# Patient Record
Sex: FEMALE | Race: WHITE | Employment: STUDENT | URBAN - METROPOLITAN AREA
[De-identification: names, ages, dates, MRNs, and addresses within clinical notes are randomized per-mention and may not be internally consistent; named-entity substitution may affect disease eponyms.]

---
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
## 2020-01-16 ENCOUNTER — HOSPITAL ENCOUNTER (EMERGENCY)
Age: 20
Discharge: HOME | End: 2020-01-16

## 2020-01-16 VITALS
HEIGHT: 64 IN | SYSTOLIC BLOOD PRESSURE: 126 MMHG | HEART RATE: 97 BPM | DIASTOLIC BLOOD PRESSURE: 87 MMHG | RESPIRATION RATE: 16 BRPM | BODY MASS INDEX: 20.49 KG/M2 | TEMPERATURE: 98 F | OXYGEN SATURATION: 98 % | WEIGHT: 120 LBS

## 2020-01-16 PROCEDURE — RU007 HC SPORTS PHYSICAL-SELF PAY

## 2020-01-16 PROCEDURE — 9900000020 HC SPORTS PHYSICAL-SELF PAY

## 2020-01-16 PROCEDURE — 4500000002 HC ER NO CHARGE

## 2020-01-16 PROCEDURE — 99999 PR OFFICE/OUTPT VISIT,PROCEDURE ONLY: CPT | Performed by: NURSE PRACTITIONER

## 2020-01-16 ASSESSMENT — ENCOUNTER SYMPTOMS
SHORTNESS OF BREATH: 0
EYE ITCHING: 0
ABDOMINAL PAIN: 0
SORE THROAT: 0
EYE REDNESS: 0
DIARRHEA: 0
CHEST TIGHTNESS: 0
VOMITING: 0
NAUSEA: 0

## 2020-01-16 ASSESSMENT — VISUAL ACUITY
OD: 20/20
OS: 20/25

## 2020-01-16 NOTE — ED NOTES
Copies of physical forms and immunization records  Attached for scanning to EHR     Silvestre Aquino RN  01/16/20 3607

## 2020-03-16 RX ORDER — SUMATRIPTAN 100 MG/1
TABLET, FILM COATED ORAL
Qty: 9 TABLET | Refills: 5 | Status: SHIPPED | OUTPATIENT
Start: 2020-03-16 | End: 2021-08-19 | Stop reason: SDUPTHER

## 2020-03-20 ENCOUNTER — TELEPHONE (OUTPATIENT)
Dept: NEUROLOGY | Age: 20
End: 2020-03-20

## 2020-03-23 ENCOUNTER — OFFICE VISIT (OUTPATIENT)
Dept: NEUROLOGY | Age: 20
End: 2020-03-23
Payer: COMMERCIAL

## 2020-03-23 VITALS
BODY MASS INDEX: 21.76 KG/M2 | WEIGHT: 122.8 LBS | HEART RATE: 73 BPM | DIASTOLIC BLOOD PRESSURE: 77 MMHG | HEIGHT: 63 IN | SYSTOLIC BLOOD PRESSURE: 110 MMHG | TEMPERATURE: 97.7 F

## 2020-03-23 PROCEDURE — 99214 OFFICE O/P EST MOD 30 MIN: CPT | Performed by: NURSE PRACTITIONER

## 2020-03-23 RX ORDER — GALCANEZUMAB 100 MG/ML
300 INJECTION, SOLUTION SUBCUTANEOUS
Qty: 1 SYRINGE | Refills: 5 | Status: SHIPPED | OUTPATIENT
Start: 2020-03-23 | End: 2021-08-26

## 2020-03-23 RX ORDER — PREDNISONE 20 MG/1
TABLET ORAL
Qty: 18 TABLET | Refills: 1 | Status: SHIPPED | OUTPATIENT
Start: 2020-03-23 | End: 2021-08-26

## 2020-03-23 NOTE — PROGRESS NOTES
3/23/2020) 30 tablet 3     No current facility-administered medications for this visit. ALLERGIES:   No Known Allergies                              REVIEW OF SYSTEMS    CONSTITUTIONAL Weight: absent, Appetite: absent, Fatigue: absent      HEENT Ears: normal, Visual disturbance: absent   RESPIRATORY Shortness of breath: absent, Cough: absent   CARDIOVASCULAR Chest pain: absent, Leg swelling :absent      GI Constipation: absent, Diarrhea: absent, Swallowing change: absent       Urinary frequency: absent, Urinary urgency: absent,   MUSCULOSKELETAL Neck pain: absent, Back pain: absent, Stiffness: absent, Muscle pain: absent, Joint pain: absent Restless legs: absent   DERMATOLOGIC Hair loss: absent, Skin changes: absent   NEUROLOGIC Memory loss: absent, Confusion: absent, Seizures: absent Trouble walking or imbalance: absent, Dizziness: absent, Weakness: absent, Numbness: absent Tremor: absent, Spasm: absent, Speech difficulty: absent, Headache: present, Light sensitivity: absent   PSYCHIATRIC Anxiety: absent, Hallucination: absent, Mood disorder: absent   HEMATOLOGIC Abnormal bleeding: absent, Anemia: absent,            PHYSICAL EXAMINATION:       Vitals:    03/23/20 0905   BP: 110/77   Pulse: 73   Temp: 97.7 °F (36.5 °C)                                              .                                                                                                     General Appearance:  Alert, cooperative, no signs of distress, appears stated age   Head:  Normocephalic, no signs of trauma   Eyes:  Conjunctiva/corneas clear;  eyelids intact   Ears:  Normal external ear and canals   Nose: Nares normal, mucosa normal, no drainage    Throat: Lips and tongue normal; teeth normal;  gums normal   Neck: Supple, intact flexion, extension and rotation;   trachea midline;  no adenopathy;   thyroid: not enlarged;   no carotid pulse abnormality   Back:   Symmetric, no curvature, ROM adequate   Lungs:   Respirations unlabored   Heart:  Regular rate and rhythm           Extremities: Extremities normal, no cyanosis, no edema   Pulses: Symmetric over head and neck   Skin: Skin color, texture normal, no rashes, no lesions                                     NEUROLOGIC EXAMINATION    Neurologic Exam  Mental status    Alert and oriented x 3; intact memory with no confusion, speech or language problems; no hallucinations or delusions  Fund of information appropriate for level of education    Cranial nerves    II - visual fields intact to confrontation bilaterally  III, IV, VI - extra-ocular muscles full: no pupillary defect; no CINTIA, no nystagmus, no ptosis   V - normal facial sensation                                                               VII - normal facial symmetry                                                             VIII - intact hearing                                                                             IX, X - symmetrical palate                                                                  XI - symmetrical shoulder shrug                                                       XII - tongue midline without atrophy or fasciculation      Motor function  Normal muscle bulk and tone; strength 5/5 on all 4 extremities, no pronator drift      Sensory function Intact to light touch, pinprick, vibration, proprioception on all 4 extremities      Cerebellar Intact fine motor movement. No involuntary movements or tremors. No ataxia or dysmetria on finger to nose or heel to shin testing      Reflex function DTR 2+ on bilateral UE and LE, symmetric. Negative Babinski      Gait                   normal base and arm swing                  ASSESSMENT AND PLAN:           In summary, your patient, Roz Guerrier exhibits the following, with associated plan:    1. Intractable cluster headache. The patient has not had a headache for almost 2 years. It is been present for approximately 3 weeks.   1. Prednisone 60 mg daily

## 2021-08-18 ENCOUNTER — TELEPHONE (OUTPATIENT)
Dept: NEUROLOGY | Age: 21
End: 2021-08-18

## 2021-08-18 NOTE — TELEPHONE ENCOUNTER
Caro's mom called in. She was last seen in March 2020. She did make an appt for next week but she wouldlike to know if Isa will refill the Sumatriptan in the  meantime to [place] in [facility]. I explained pt. need to be seen on a yearly basis but will ask Isa LEONARD if she would order a refill  until she can be seen next week.

## 2021-08-19 RX ORDER — SUMATRIPTAN 100 MG/1
TABLET, FILM COATED ORAL
Qty: 9 TABLET | Refills: 0 | Status: SHIPPED | OUTPATIENT
Start: 2021-08-19 | End: 2021-08-26 | Stop reason: SDUPTHER

## 2021-08-23 DIAGNOSIS — G44.011 INTRACTABLE EPISODIC CLUSTER HEADACHE: Primary | ICD-10-CM

## 2021-08-23 NOTE — TELEPHONE ENCOUNTER
Pt's father called in stating pt's cluster migraines are getting worse. She has an appt on Thursday but they do not think she can wait until then. She has used her allotted amount of Imitrex since picking up it up last week. He just informed me that she using the Imitrex every 12 hours. I did inform him that it could be causing medication overuse migraines. He is wondering if there is something else that she can be prescribed? Please advise, thanks.

## 2021-08-23 NOTE — TELEPHONE ENCOUNTER
I want to give her a steroid taper and give her Emgality 300 mg injection.   If she is agreeable we can send the prescription to her pharmacy

## 2021-08-24 RX ORDER — PREDNISONE 20 MG/1
TABLET ORAL
Qty: 18 TABLET | Refills: 0 | Status: SHIPPED | OUTPATIENT
Start: 2021-08-24 | End: 2021-08-26 | Stop reason: SDUPTHER

## 2021-08-24 RX ORDER — GALCANEZUMAB 100 MG/ML
300 INJECTION, SOLUTION SUBCUTANEOUS
Qty: 3 SYRINGE | Refills: 0 | Status: SHIPPED | OUTPATIENT
Start: 2021-08-24 | End: 2021-08-26 | Stop reason: SDUPTHER

## 2021-08-26 ENCOUNTER — OFFICE VISIT (OUTPATIENT)
Dept: NEUROLOGY | Age: 21
End: 2021-08-26
Payer: COMMERCIAL

## 2021-08-26 VITALS
HEIGHT: 63 IN | BODY MASS INDEX: 22.86 KG/M2 | DIASTOLIC BLOOD PRESSURE: 80 MMHG | WEIGHT: 129 LBS | SYSTOLIC BLOOD PRESSURE: 120 MMHG | HEART RATE: 65 BPM

## 2021-08-26 DIAGNOSIS — G44.011 INTRACTABLE EPISODIC CLUSTER HEADACHE: Primary | ICD-10-CM

## 2021-08-26 PROCEDURE — 99214 OFFICE O/P EST MOD 30 MIN: CPT | Performed by: NURSE PRACTITIONER

## 2021-08-26 RX ORDER — PREDNISONE 20 MG/1
TABLET ORAL
Qty: 18 TABLET | Refills: 0 | Status: SHIPPED | OUTPATIENT
Start: 2021-08-26 | End: 2021-09-04

## 2021-08-26 RX ORDER — SUMATRIPTAN 100 MG/1
TABLET, FILM COATED ORAL
Qty: 9 TABLET | Refills: 5 | Status: SHIPPED | OUTPATIENT
Start: 2021-08-26

## 2021-08-26 RX ORDER — SUMATRIPTAN 6 MG/.5ML
6 INJECTION, SOLUTION SUBCUTANEOUS
Qty: 9 VIAL | Refills: 5 | Status: SHIPPED | OUTPATIENT
Start: 2021-08-26 | End: 2021-08-26

## 2021-08-26 RX ORDER — GALCANEZUMAB 100 MG/ML
300 INJECTION, SOLUTION SUBCUTANEOUS
Qty: 3 SYRINGE | Refills: 5 | Status: SHIPPED | OUTPATIENT
Start: 2021-08-26

## 2021-08-26 RX ORDER — SUMATRIPTAN 20 MG/1
1 SPRAY NASAL PRN
Qty: 1 INHALER | Refills: 3 | Status: SHIPPED | OUTPATIENT
Start: 2021-08-26 | End: 2022-08-26

## 2021-08-26 NOTE — PROGRESS NOTES
[facility]            [address]          [place], [address] [facility]          Dept: [phone]          Dept Fax: [phone]        MD No Magaña MD Dory Bound, MD Karie Prier, CNP            8/26/2021      HISTORY OF PRESENT ILLNESS:       I had the pleasure of seeing Zhen Pop, who returns for continuing neurologic care. The patient was seen last on March 23, 2020 for treatment of episodic cluster headaches. The patient was initially seen in April 2018 at [facility].  Her headaches are typically on the left side of her head involving the left orbit and temporal area associated with nausea vomiting, photophobia, phonophobia, tearing of the left eye, nasal congestion, and runny nose. At that time, she developed a slight ptosis of her left eye. Her headaches are a 10/10 in intensity. She denies any other neurologic symptoms associated with her headaches. She has had previous episodes in 2016 and 2014. Her episodes typically will last for approximately 3 to 4 weeks. She was treated with Diantha [place] in the past, which made her lightheaded. The patient typically will use 100% oxygen via nonrebreather mask at home which does significantly help to abort her headaches, but Imitrex does work more effectively. Last headache was in March 2020. She was treated with a prednisone taper and Emgality. Called the office on August 24 stating that she started to have another episode of her cluster headaches. The headache cluster headaches are typical as described above. She was getting several a day and using oxygen via nonrebreather mask. They started approximately 2 weeks ago. When she called on August 24, she was given a prednisone taper as well as Emgality 300 mg. She was just able to take those medications yesterday on August 25.   She is feeling slightly better. She does get relief with oral Imitrex as well as subcutaneous Imitrex. PAST MEDICAL HISTORY:         Diagnosis Date    Cluster headache         PAST SURGICAL HISTORY:         Procedure Laterality Date    FOOT SURGERY Left     TONSILLECTOMY          SOCIAL HISTORY:     Social History     Socioeconomic History    Marital status: Single     Spouse name: Not on file    Number of children: Not on file    Years of education: Not on file    Highest education level: Not on file   Occupational History    Not on file   Tobacco Use    Smoking status: Never Smoker    Smokeless tobacco: Never Used   Vaping Use    Vaping Use: Never used   Substance and Sexual Activity    Alcohol use: No    Drug use: No    Sexual activity: Not on file   Other Topics Concern    Not on file   Social History Narrative    Not on file     Social Determinants of Health     Financial Resource Strain:     Difficulty of Paying Living Expenses:    Food Insecurity:     Worried About Running Out of Food in the Last Year:     Ran Out of Food in the Last Year:    Transportation Needs:     Lack of Transportation (Medical):      Lack of Transportation (Non-Medical):    Physical Activity:     Days of Exercise per Week:     Minutes of Exercise per Session:    Stress:     Feeling of Stress :    Social Connections:     Frequency of Communication with Friends and Family:     Frequency of Social Gatherings with Friends and Family:     Attends Congregational Services:     Active Member of Clubs or Organizations:     Attends Club or Organization Meetings:     Marital Status:    Intimate Partner Violence:     Fear of Current or Ex-Partner:     Emotionally Abused:     Physically Abused:     Sexually Abused:        CURRENT MEDICATIONS:     Current Outpatient Medications   Medication Sig Dispense Refill    Galcanezumab-gnlm (EMGALITY, 300 MG DOSE,) 100 MG/ML SOSY Inject 300 mg into the skin every 30 days 3 Syringe 0    predniSONE (DELTASONE) 20 MG tablet Take 3 po qd x 3 days, 2 po qd x 3 days the 1 po qd x 3 days 18 tablet 0    SUMAtriptan (IMITREX) 100 MG tablet Take 1 tab at HA onset, may repeat in 2 hrs PRN, no more than 2 tabs/day, 4 tabs/wk. 9 tablet 0    predniSONE (DELTASONE) 20 MG tablet 3 tabs QD x 3 days then 2 tabs x 3 days then 1 tab QD x 3 days 18 tablet 1    Galcanezumab-gnlm (EMGALITY, 300 MG DOSE,) 100 MG/ML SOSY Inject 300 mg into the skin every 30 days 1 Syringe 5    SUMAtriptan (IMITREX) 25 MG tablet Take 2 tablets by mouth once as needed for Migraine (Patient not taking: Reported on 3/23/2020) 30 tablet 3     No current facility-administered medications for this visit. ALLERGIES:   No Known Allergies                              REVIEW OF SYSTEMS        All items selected indicate a positive finding. Those items not selected are negative. Constitutional [] Weight loss/gain   [] Fatigue  [] Fever/Chills   HEENT [] Hearing Loss  [] Visual Disturbance  [] Tinnitus  [] Eye pain   Respiratory [] Shortness of Breath  [] Cough  [] Snoring   Cardiovascular [] Chest Pain  [] Palpitations  [] Lightheaded   GI [] Constipation  [] Diarrhea  [] Swallowing change  [] Nausea/vomiting    [] Urinary Frequency  [] Urinary Urgency   Musculoskeletal [] Neck pain  [] Back pain  [] Muscle pain  [] Restless legs   Dermatologic [] Skin changes   Neurologic [] Memory loss/confusion  [] Seizures  [] Trouble walking or imbalance  [] Dizziness  [] Sleep disturbance  [] Weakness  [] Numbness  [] Tremors  [] Speech Difficulty  [] Headaches  [] Light Sensitivity  [] Sound Sensitivity   Endocrinology []Excessive thirst  []Excessive hunger   Psychiatric [] Anxiety/Depression  [] Hallucination   Allergy/immunology []Hives/environmental allergies   Hematologic/lymph [] Abnormal bleeding  [] Abnormal bruising         PHYSICAL EXAMINATION:       There were no vitals filed for this visit. .                                                                                                    General Appearance:  Alert, cooperative, no signs of distress, appears stated age   Head:  Normocephalic, no signs of trauma   Eyes:  Conjunctiva/corneas clear;  eyelids intact   Ears:  Normal external ear and canals   Nose: Nares normal, mucosa normal, no drainage    Throat: Lips and tongue normal; teeth normal;  gums normal   Neck: Supple, intact flexion, extension and rotation;   trachea midline;  no adenopathy;   thyroid: not enlarged;   no carotid pulse abnormality   Back:   Symmetric, no curvature, ROM adequate   Lungs:   Respirations unlabored   Heart:  Regular rate and rhythm           Extremities: Extremities normal, no cyanosis, no edema   Pulses: Symmetric over head and neck   Skin: Skin color, texture normal, no rashes, no lesions                                     NEUROLOGIC EXAMINATION    Neurologic Exam  Mental status    Alert and oriented x 3; intact memory with no confusion, speech or language problems; no hallucinations or delusions  Fund of information appropriate for level of education    Cranial nerves    II - visual fields intact to confrontation bilaterally  III, IV, VI - extra-ocular muscles full: no pupillary defect; no CINTIA, no nystagmus, no ptosis   V - normal facial sensation                                                               VII - normal facial symmetry                                                             VIII - intact hearing                                                                             IX, X - symmetrical palate                                                                  XI - symmetrical shoulder shrug                                                       XII - tongue midline without atrophy or fasciculation      Motor function  Normal muscle bulk and tone; strength 5/5 on all 4 extremities, no pronator drift      Sensory function Intact to light touch, pinprick, vibration, proprioception on all 4 extremities      Cerebellar Intact fine motor movement. No involuntary movements or tremors. No ataxia or dysmetria on finger to nose or heel to shin testing      Reflex function DTR 2+ on bilateral UE and LE, symmetric. Negative Babinski      Gait                   normal base and arm swing                  Medical Decision Making: In summary, your patient, Gil Leyva exhibits the following, with associated plan:    1. Episodic intractable cluster headache  1. The patient took Emgality 300 mg yesterday  2. Continue prednisone taper  3. Continue to use the nonrebreather mask  4. Imitrex intranasal in the contralateral nostril 20 mg  5. Patient was given an Imitrex injection prescription for a vial of 6 mg per 0.5 mL. The patient was advised to use 2 mg at a time  6. In the future, when the patient begins to have another episode of her cluster headaches, she will have on hand prednisone taper and Emgality injection. She will contact the office in the event that this occurs. The patient will also contact the office if this current cluster episode does not resolve. She will be seen on an annual basis whether she has an episodic cluster not            Signed: Angela Rendon CNP      *Please note that portions of this note were completed with a voice recognition program.  Although every effort was made to insure the accuracy of this automated transcription, some errors in transcription may have occurred, occasionally words and are mis-transcribed    Provider Attestation: The documentation recorded by the scribe accurately reflects the service I personally performed and the decisions made by myself. Portions of this note were transcribed by a scribe. I personally performed the history, physical exam, and medical decision-making and confirm the accuracy of the information in the transcribed note.      Scribe Attestation:   By signing my name below, I, PRASANNA Rubio - CNP, attest that this documentation has been prepared under the direction and in the presence of Angela Rendon CNP.

## 2022-03-08 ENCOUNTER — HOSPITAL ENCOUNTER (OUTPATIENT)
Age: 22
Discharge: HOME | End: 2022-03-08

## 2022-03-11 LAB — VZV IGG SER QL IA: 0.68

## 2022-06-09 ENCOUNTER — TELEPHONE (OUTPATIENT)
Dept: NEUROLOGY | Age: 22
End: 2022-06-09

## 2022-06-09 NOTE — TELEPHONE ENCOUNTER
Ramona Ram called the office this morning asking if you could call her when you get a chance. Patient states that she has a couple questions regarding her medication and you told her that she could call and speak with you anytime. Johnathan Hua also stated that she is getting  next month, July 23rd. She can be reached at [phone].

## 2022-11-02 ENCOUNTER — NURSE ONLY (OUTPATIENT)
Dept: FAMILY MEDICINE CLINIC | Age: 22
End: 2022-11-02
Payer: COMMERCIAL

## 2022-11-02 DIAGNOSIS — Z11.1 ENCOUNTER FOR PPD TEST: Primary | ICD-10-CM

## 2022-11-02 PROCEDURE — 86580 TB INTRADERMAL TEST: CPT | Performed by: STUDENT IN AN ORGANIZED HEALTH CARE EDUCATION/TRAINING PROGRAM

## 2022-11-02 NOTE — PROGRESS NOTES
After obtaining consent, and per orders of Dr. Vincent Sahu, injection of Mantoux given in Right arm by Orlin Miguel MA. Patient instructed to remain in clinic for 20 minutes afterwards, and to report any adverse reaction to me immediately. Immunizations Administered       Name Date Dose Route    PPD Test 11/2/2022 0.1 mL Intradermal    Site: Right Forearm    Lot: [id number]    NDC: 60449-856-99            Pt tolerated injection well. Aware that injection needs checked within 48-72hrs.